# Patient Record
(demographics unavailable — no encounter records)

---

## 2024-12-10 NOTE — REASON FOR VISIT
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

## 2024-12-10 NOTE — HISTORY OF PRESENT ILLNESS
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 1 month after procedure, he reported intermittent chest discomfort which slowly improved day by day. However for the past month, he feels mid-sternal/left sided chest discomfort with associated SOB. It occurs randomly without clear triggers. It is not related to exertion. He does not feel palpitation, dizziness, LOC, orthopnea, PND, or LE edema. He also notices his diastolic pressure is usually above 90. He is unsure if it is related to stress/anxiety but wants to make sure his heart is okay.  10/10/23 - Pt reports intermittent chest discomfort and feelings of SOB for many years. They have random triggers and not related to exertion. He reports palpitations only if he drinks coffee which he stopped for a long time. He denies dizziness or LOC. No orthopnea, PND or LE edema. 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 He denies any FHx of sudden cardiac death

## 2024-12-10 NOTE — HISTORY OF PRESENT ILLNESS
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 1 month after procedure, he reported intermittent chest discomfort which slowly improved day by day. However for the past month, he feels mid-sternal/left sided chest discomfort with associated SOB. It occurs randomly without clear triggers. It is not related to exertion. He does not feel palpitation, dizziness, LOC, orthopnea, PND, or LE edema. He also notices his diastolic pressure is usually above 90. He is unsure if it is related to stress/anxiety but wants to make sure his heart is okay.  10/10/23 - Pt reports intermittent chest discomfort and feelings of SOB for many years. They have random triggers and not related to exertion. He reports palpitations only if he drinks coffee which he stopped for a long time. He denies dizziness or LOC. No orthopnea, PND or LE edema. 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 He denies any FHx of sudden cardiac death

## 2024-12-10 NOTE — ASSESSMENT
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  1) WPW pattern s/p ablation  2) Chest discomfort, atypical in nature 3) Dyspnea, associated with the chest discomfort - EKG 12/10/24 showed sinus rhythm without significant abnormalities - I advised pt to undergo treadmill stress test to r/o ischemia; pt did 11:47 min Miquel protocol, did not have CP and had no significant ischemic ECG changes. There was no evidence of pre-excitation at rest or with stress. - Given SOB, I advised pt to undergo TTE to structural heart disease, r/o pericardial effusion - Repeat 1 week event monitor to r/o arrhythmia - If above testing is unremarkable, his symptoms may be MSK related vs. stress/anxiety  4) Follow-up, pending echo and monitor

## 2025-01-23 NOTE — HISTORY OF PRESENT ILLNESS
[FreeTextEntry1] : Pt reports rare episodes of mid-sternal/L sided chest discomfort, unrelated to exertion and has random triggers. He states since his procedure, he hasn't exercised much due to fear of his heart having issues.   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 1 month after procedure, he reported intermittent chest discomfort which slowly improved day by day. However for the past month, he feels mid-sternal/left sided chest discomfort with associated SOB. It occurs randomly without clear triggers. It is not related to exertion. He does not feel palpitation, dizziness, LOC, orthopnea, PND, or LE edema. He also notices his diastolic pressure is usually above 90. He is unsure if it is related to stress/anxiety but wants to make sure his heart is okay.  10/10/23 - Pt reports intermittent chest discomfort and feelings of SOB for many years. They have random triggers and not related to exertion. He reports palpitations only if he drinks coffee which he stopped for a long time. He denies dizziness or LOC. No orthopnea, PND or LE edema. 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 He denies any FHx of sudden cardiac death

## 2025-01-23 NOTE — ASSESSMENT
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  1) WPW pattern s/p ablation 2) Chest discomfort, atypical in nature - Pt underwent treadmill stress test 12/10/24; he did 11:47 min Miquel protocol, did not have CP and had no significant ischemic ECG changes. There was no evidence of pre-excitation at rest or with stress. - I advised pt to undergo TTE 1/23/25 which showed normal LV and RV function without significant valvular disease - Repeat 1 week monitor 1/2025 showed sinus rhythm , avg 80 bpm, rare PACs, rare PVCs, 1 pAT episode lasting 3-4 beats at 158 bpm - Pt was reassured that is symptoms are less likely cardiac-related.  - In regards to his borderline diastolic blood pressure, discussed dietary/lifestyle changes and cutting down on salt intake. He states since his ablation, he hasn't been working out. We discussed that he may start to increase his activity as tolerated and exercise.  3) Follow-up, 3 months or sooner if symptomatic